# Patient Record
Sex: FEMALE | Race: WHITE | Employment: PART TIME | URBAN - METROPOLITAN AREA
[De-identification: names, ages, dates, MRNs, and addresses within clinical notes are randomized per-mention and may not be internally consistent; named-entity substitution may affect disease eponyms.]

---

## 2023-05-19 ENCOUNTER — HOSPITAL ENCOUNTER (EMERGENCY)
Age: 23
Discharge: HOME | End: 2023-05-19
Attending: EMERGENCY MEDICINE
Payer: MEDICAID

## 2023-05-19 VITALS
RESPIRATION RATE: 16 BRPM | TEMPERATURE: 98 F | OXYGEN SATURATION: 96 % | SYSTOLIC BLOOD PRESSURE: 148 MMHG | WEIGHT: 260 LBS | HEART RATE: 116 BPM | DIASTOLIC BLOOD PRESSURE: 84 MMHG

## 2023-05-19 DIAGNOSIS — H60.331 ACUTE SWIMMER'S EAR OF RIGHT SIDE: Primary | ICD-10-CM

## 2023-05-19 PROCEDURE — 99283 EMERGENCY DEPT VISIT LOW MDM: CPT

## 2023-05-19 RX ORDER — ESCITALOPRAM OXALATE 5 MG/1
5 TABLET ORAL DAILY
COMMUNITY

## 2023-05-19 RX ORDER — LAMOTRIGINE 25 MG/1
25 TABLET ORAL DAILY
COMMUNITY

## 2023-05-19 RX ORDER — NEOMYCIN SULFATE, POLYMYXIN B SULFATE AND HYDROCORTISONE 10; 3.5; 1 MG/ML; MG/ML; [USP'U]/ML
4 SUSPENSION/ DROPS AURICULAR (OTIC) 4 TIMES DAILY
Qty: 10 ML | Refills: 0 | Status: SHIPPED | OUTPATIENT
Start: 2023-05-19 | End: 2023-05-26

## 2023-10-09 ENCOUNTER — HOSPITAL ENCOUNTER (EMERGENCY)
Age: 23
Discharge: HOME | End: 2023-10-09

## 2023-10-09 VITALS
HEART RATE: 93 BPM | BODY MASS INDEX: 42.68 KG/M2 | HEIGHT: 64 IN | OXYGEN SATURATION: 96 % | RESPIRATION RATE: 16 BRPM | TEMPERATURE: 98 F | DIASTOLIC BLOOD PRESSURE: 80 MMHG | WEIGHT: 250 LBS | SYSTOLIC BLOOD PRESSURE: 119 MMHG

## 2023-10-09 DIAGNOSIS — Z48.89 ENCOUNTER FOR POST SURGICAL WOUND CHECK: Primary | ICD-10-CM

## 2023-10-09 PROCEDURE — 99282 EMERGENCY DEPT VISIT SF MDM: CPT

## 2023-10-09 NOTE — ED INITIAL ASSESSMENT (HPI)
Had lynda mastectomy on 9/21 - states yesterday noticed yellow discharge from nipples - bilaterally and also redness - denies fevers

## 2025-05-04 ENCOUNTER — HOSPITAL ENCOUNTER (EMERGENCY)
Age: 25
Discharge: HOME | End: 2025-05-04
Payer: MEDICAID

## 2025-05-04 VITALS
BODY MASS INDEX: 45.24 KG/M2 | HEIGHT: 64 IN | DIASTOLIC BLOOD PRESSURE: 77 MMHG | RESPIRATION RATE: 16 BRPM | OXYGEN SATURATION: 97 % | HEART RATE: 76 BPM | SYSTOLIC BLOOD PRESSURE: 129 MMHG | TEMPERATURE: 97 F | WEIGHT: 265 LBS

## 2025-05-04 DIAGNOSIS — H66.001 NON-RECURRENT ACUTE SUPPURATIVE OTITIS MEDIA OF RIGHT EAR WITHOUT SPONTANEOUS RUPTURE OF TYMPANIC MEMBRANE: Primary | ICD-10-CM

## 2025-05-04 DIAGNOSIS — H61.21 IMPACTED CERUMEN OF RIGHT EAR: ICD-10-CM

## 2025-05-04 DIAGNOSIS — H60.311 ACUTE DIFFUSE OTITIS EXTERNA OF RIGHT EAR: ICD-10-CM

## 2025-05-04 PROCEDURE — 99283 EMERGENCY DEPT VISIT LOW MDM: CPT

## 2025-05-04 RX ORDER — CIPROFLOXACIN AND DEXAMETHASONE 3; 1 MG/ML; MG/ML
3 SUSPENSION/ DROPS AURICULAR (OTIC) 2 TIMES DAILY
Qty: 7.5 ML | Refills: 0 | Status: SHIPPED | OUTPATIENT
Start: 2025-05-04 | End: 2025-05-11

## 2025-05-04 RX ORDER — AMOXICILLIN 875 MG/1
875 TABLET, COATED ORAL 2 TIMES DAILY
Qty: 14 TABLET | Refills: 0 | Status: SHIPPED | OUTPATIENT
Start: 2025-05-04 | End: 2025-05-11

## 2025-05-04 NOTE — DISCHARGE INSTRUCTIONS
Start taking amoxicillin every 12 hours for the next 7 days.  Use Cipro dexamethasone eardrops daily as directed to completion.  Use Debrox which is available over-the-counter for ear cleaning.  Return to the ER for any other new or worsening symptoms.

## 2025-05-04 NOTE — ED PROVIDER NOTES
Patient Seen in: [facility] [place]      History     Chief Complaint   Patient presents with    Ear Problem Pain     Stated Complaint: right ear pain    Subjective:   HPI    24-year-old female who presents to the ER for right ear pain for the past 3 days.  States initially felt that hearing was \"muffled\" but now with right ear pain.  Reports using Q-tips at home but no other foreign bodies.  Denies any recent congestion, fever or headaches or any other complaints.    Objective:     Past Medical History:    Bipolar affective (HCC)              Past Surgical History:   Procedure Laterality Date    Mastectomy                  Social History     Socioeconomic History    Marital status: Single   Tobacco Use    Smoking status: Never    Smokeless tobacco: Never   Vaping Use    Vaping status: Never Used   Substance and Sexual Activity    Alcohol use: Yes     Comment: socially    Drug use: Never     Social Drivers of Health     Food Insecurity: Not on File (2024)    Received from [email or web address]    Food Insecurity     Food: 0   Transportation Needs: Not on File (2023)    Received from [email or web address]    Transportation Needs     Transportation: 0   Housing Stability: Not on File (2023)    Received from [email or web address]    Housing Stability     Housin                                Physical Exam     ED Triage Vitals [25 1655]   /87   Pulse 78   Resp 20   Temp 97.2 °F (36.2 °C)   Temp src Temporal   SpO2 97 %   O2 Device None (Room air)       Current Vitals:   Vital Signs  BP: 138/87  Pulse: 78  Resp: 20  Temp: 97.2 °F (36.2 °C)  Temp src: Temporal    Oxygen Therapy  SpO2: 97 %  O2 Device: None (Room air)        Physical Exam  GENERAL APPEARANCE:  AxOx4, generally well-appearing, no acute distress.  HEENT:  NC, AT.  Impacted cerumen bilaterally.  No mastoid tenderness bilaterally and no tenderness with manipulation of the tragus or pinna.  Once the earwax was removed from the right side revealed a  erythematous external canal with a bulging erythematous tympanic membrane, no perforations were noted.  NECK:  Supple without lymphadenopathy.  No stiffness or restricted ROM.  RESPIRATORY: Normal rate, no respiratory distress.  EXTREMITIES:  Without cyanosis, clubbing or edema. Normal ROM.  NEUROLOGICAL:  Grossly nonfocal. Observed to ambulate with normal gait.  Skin: Normal color and no visible rashes.     Physical Exam                ED Course   Labs Reviewed - No data to display            MDM      24-year-old female to male who presents to the ER for ear pain of the right side.  Vitals within normal limits.  Differential diagnosis includes but does not exclude otitis externa versus media versus mastoiditis.  No mastoid tenderness on exam the patient otherwise appears well, nontoxic and afebrile.  Cerumen impaction was removed and revealed otitis externa and media.  Plan to start on antibiotics.  Discussed continued supportive management at home and return precautions.  Ready for discharge.        Medical Decision Making      Disposition and Plan     Clinical Impression:  1. Non-recurrent acute suppurative otitis media of right ear without spontaneous rupture of tympanic membrane    2. Acute diffuse otitis externa of right ear    3. Impacted cerumen of right ear         Disposition:  Discharge  5/4/2025  6:19 pm    Follow-up:  No follow-up provider specified.        Medications Prescribed:  Current Discharge Medication List        START taking these medications    Details   amoxicillin 875 MG Oral Tab Take 1 tablet (875 mg total) by mouth 2 (two) times daily for 7 days.  Qty: 14 tablet, Refills: 0      ciprofloxacin-dexamethasone 0.3-0.1 % Otic Suspension Place 3 drops into the right ear 2 (two) times daily for 7 days.  Qty: 7.5 mL, Refills: 0             Supplementary Documentation: